# Patient Record
Sex: MALE | Race: WHITE
[De-identification: names, ages, dates, MRNs, and addresses within clinical notes are randomized per-mention and may not be internally consistent; named-entity substitution may affect disease eponyms.]

---

## 2016-09-27 VITALS
DIASTOLIC BLOOD PRESSURE: 86 MMHG | DIASTOLIC BLOOD PRESSURE: 86 MMHG | SYSTOLIC BLOOD PRESSURE: 129 MMHG | SYSTOLIC BLOOD PRESSURE: 129 MMHG

## 2017-02-06 ENCOUNTER — HOSPITAL ENCOUNTER (OUTPATIENT)
Dept: HOSPITAL 44 - OPSURG | Age: 57
End: 2017-02-06
Attending: INTERNAL MEDICINE
Payer: COMMERCIAL

## 2017-02-06 DIAGNOSIS — K29.70: ICD-10-CM

## 2017-02-06 DIAGNOSIS — K20.9: ICD-10-CM

## 2017-02-06 DIAGNOSIS — F17.210: ICD-10-CM

## 2017-02-06 DIAGNOSIS — F10.21: ICD-10-CM

## 2017-02-06 DIAGNOSIS — K22.2: Primary | ICD-10-CM

## 2017-02-06 PROCEDURE — S1016 NON-PVC INTRAVENOUS ADMINIST: HCPCS

## 2017-02-06 PROCEDURE — 43248 EGD GUIDE WIRE INSERTION: CPT

## 2017-02-06 PROCEDURE — 88342 IMHCHEM/IMCYTCHM 1ST ANTB: CPT

## 2017-02-06 PROCEDURE — 88305 TISSUE EXAM BY PATHOLOGIST: CPT

## 2017-02-06 NOTE — GI REPORT
REFERRING PHYSICIAN:

Dr. Niles Clark



GASTROENTEROLOGIST: 

Donald Gerhardt, MD



PROCEDURE MEDICATION:

Propofol as per anesthesia. 



INDICATIONS: 

A 56-year-old man who has has progressive difficulty swallowing over the last 
couple of months.  He states he has never been evaluated before.  Sometimes 
meats, bread, and dry things stop.  He may be able to drink liquids to get them 
down or he may have to regurgitate them.   Patient has a history of alcohol 
abuse in the past but has not drank for a couple of years.  He still smokes a 
pack-and-a-half of cigarettes a day for 30 plus years.  He has had a previous 
cholecystectomy.  He is 5 feet 8 inches and weighs 220 pounds and carries that 
weight centrally.  



PROCEDURE PERFORMED: 

Endoscopy and esophageal dilatation with biopsies. 



PROCEDURE: 

An [employer] video endoscope was passed through the esophagus under direct 
visualization.  Patient has grade 3 esophagitis at the GE junction with 
stricture.   The cardia of the stomach with no obvious mass.  He has severe 
gastritis particularly in the body and antrum with erosions, friability, and 
ulceration.  Duodenal bulb also shows ulceration, superficial.  A guidewire was 
placed in the stomach.  The endoscope was removed and a 51-Samoan, which is 17 
mm, passed without resistance.  The endoscope was re-introduced.  There was 
some bleeding where the stricture was stretched.  We did biopsy the stomach for 
pathology.  We biopsied the GE junction, esophagitis.  I do not see an obvious 
malignancy, though he is a high risk for that.  



FINDINGS: 

1.    Esophageal stricture dilated to a 51-Samoan.

2.    Grade 3 esophagitis. 

3.    Severe gastritis. 



RECOMMENDATIONS: 

1.    Pending the biopsies, he needs to be on a PPI daily like omeprazole 40 mg 
or 20 mg twice a day before breakfast and supper.  

2.    I recommend strongly discontinue tobacco usage.

3.    Sleep with the bed elevated. 

4.    Pending the pathology, if there is Moore's esophagus, or anything else, 
he may need this re-looked at again within 2 years. 





cc:   Dr. Niles MACKENZIE

## 2017-06-09 ENCOUNTER — HOSPITAL ENCOUNTER (OUTPATIENT)
Dept: HOSPITAL 44 - LABRHC | Age: 57
End: 2017-06-09
Attending: FAMILY MEDICINE
Payer: COMMERCIAL

## 2017-06-09 DIAGNOSIS — N39.0: Primary | ICD-10-CM

## 2017-06-09 PROCEDURE — 87186 SC STD MICRODIL/AGAR DIL: CPT

## 2017-06-09 PROCEDURE — 87086 URINE CULTURE/COLONY COUNT: CPT

## 2018-09-03 ENCOUNTER — HOSPITAL ENCOUNTER (EMERGENCY)
Dept: HOSPITAL 44 - ED | Age: 58
Discharge: HOME | End: 2018-09-03
Payer: COMMERCIAL

## 2018-09-03 VITALS
DIASTOLIC BLOOD PRESSURE: 68 MMHG | SYSTOLIC BLOOD PRESSURE: 140 MMHG | SYSTOLIC BLOOD PRESSURE: 140 MMHG | DIASTOLIC BLOOD PRESSURE: 68 MMHG

## 2018-09-03 DIAGNOSIS — N30.01: Primary | ICD-10-CM

## 2018-09-03 PROCEDURE — 87086 URINE CULTURE/COLONY COUNT: CPT

## 2018-09-03 PROCEDURE — 81002 URINALYSIS NONAUTO W/O SCOPE: CPT

## 2018-09-03 PROCEDURE — 99283 EMERGENCY DEPT VISIT LOW MDM: CPT

## 2018-09-03 RX ADMIN — NITROFURANTOIN (MONOHYDRATE/MACROCRYSTALS) ONE MG: 75; 25 CAPSULE ORAL at 21:25

## 2018-09-03 RX ADMIN — DOXYCYCLINE ONE MG: 100 CAPSULE ORAL at 21:25

## 2018-09-03 NOTE — ED PHYSICIAN DOCUMENTATION
General Adult





- HISTORIAN


Historian: patient





- HPI


Stated Complaint: Right Testicular Pain/Difficulty Urinating


Chief Complaint: General Adult


Additional Information: 





Lower abdominal and right scrotal pain began two days ago, with difficulty 

urinating. Took two leftover antibiotic pills earlier today and thought they 

made him better. Had UTI a year ago, his first. No fever. No other modifying 

factors or associated signs. 





- ROS


CONST: no problems





- PAST HX


Past History: hypertension


Allergies/Adverse Reactions: 


                                    Allergies











Allergy/AdvReac Type Severity Reaction Status Date / Time


 


codeine Allergy Intermediate Hives Verified 09/03/18 20:42














Home Medications: 


                                Ambulatory Orders











 Medication  Instructions  Recorded


 


Doxycycline Hyclate [Vibramycin] 100 mg PO Q12H #20 capsule 09/03/18


 


Nitrofurantoin Monohyd/M-Cryst 100 mg PO Q12H #14 capsule 09/03/18





[Macrobid 100 mg Capsule]  


 


amLODIPine BESYLATE [Norvasc] 5 mg PO DAILY 09/03/18














- SOCIAL HX


Smoking History: cigarettes (1- 1 1/2 PPD since 10 y/o)





- FAMILY HX


Family History: No





- VITAL SIGNS


Vital Signs: 





                                   Vital Signs











Temp Pulse Resp BP Pulse Ox


 


 98.1 F   82   18   158/68   98 


 


 09/03/18 20:30  09/03/18 20:30  09/03/18 20:30  09/03/18 20:30  09/03/18 20:30














- REVIEWED ASSESSMENTS


Nursing Assessment  Reviewed: Yes


Vitals Reviewed: Yes





Progress





- Progress


Progress: 





Given macrobid and doxycycline for likelihood of epididymitis in addition to UTI

. 





ED Results Lab/Radiology





- Orders


Orders: 





                                    ED Orders











 Category Date Time Status


 


 Doxycycline Monohydrate [Vibramycin] Med  09/03/18 21:21 Once





 100 mg PO NOW ONE   


 


 Nitrofurantoin Monohyd/M-Cryst [Macrobid] Med  09/03/18 21:21 Once





 100 mg PO NOW ONE   














General Adult Physical Exam





- PHYSICAL EXAM


GENERAL APPEARANCE: mild distress


EENT: eye inspection normal, ENT inspection normal


NECK: normal inspection


RESPIRATORY: no resp distress, breath sounds normal


CVS: reg rate & rhythm, heart sounds normal


ABDOMEN: other (right testicle quite tender to touch. Not tense.)


BACK: no CVA tenderness, other (no vertebral tenderness)


SKIN: warm/dry, normal color


EXTREMITIES: normal range of motion (slow gait), no evidence of injury


NEURO: CN's nml as tested, motor nml, sensation nml, cognition normal





Discharge


Clincal Impression: 


UTI (urinary tract infection)


Qualifiers:


 Urinary tract infection type: acute cystitis Hematuria presence: with hematuria

 Qualified Code(s): N30.01 - Acute cystitis with hematuria





Prescriptions: 


Doxycycline Hyclate [Vibramycin] 100 mg PO Q12H #20 capsule


Nitrofurantoin Monohyd/M-Cryst [Macrobid 100 mg Capsule] 100 mg PO Q12H #14 

capsule


Referrals: 


Niles Clark MD [Primary Care Provider] - 2 Days


Condition: Good


Disposition: 01 HOME, SELF-CARE


Decision to Admit: NO


Decision Time: 21:32

## 2018-09-04 LAB
APPEARANCE UR: (no result)
COLOR,URINE: YELLOW
PH UR STRIP: 6.5 [PH] (ref 5–8)
RBC UR QL: (no result)
UROBILINOGEN URINE: 1 EU (ref 0.2–1)

## 2018-09-05 ENCOUNTER — HOSPITAL ENCOUNTER (OUTPATIENT)
Dept: HOSPITAL 44 - RAD | Age: 58
End: 2018-09-05
Attending: FAMILY MEDICINE
Payer: COMMERCIAL

## 2018-09-05 DIAGNOSIS — M25.562: ICD-10-CM

## 2018-09-05 DIAGNOSIS — M25.512: Primary | ICD-10-CM

## 2018-09-05 PROCEDURE — 73562 X-RAY EXAM OF KNEE 3: CPT

## 2018-09-05 PROCEDURE — 73030 X-RAY EXAM OF SHOULDER: CPT

## 2018-09-05 NOTE — DIAGNOSTIC IMAGING REPORT
ADWOA BETANCUR 

[facility]

[address] [facility] [address] [address]

 

 

 

 

Report Submission Date: Sep 5, 2018 12:52:20 PM CDT

Patient       Study

Name:   KEDAR GILMORE       Date:   Sep 5, 2018 12:17:35 PM CDT

MRN:   [id number]       Modality Type:   DX

Gender:   M       Description:   SHOULDER

:   [birth year]       Institution:   [facility]

Physician:   ADWOA BETANCUR

     Accession:    [id number]

 

 

Examination: Plain film left shoulder 



History: LEFT SHOULDER, PAIN IN LEFT SHOULDER WORSENING FOR ABOUT A MONTH, NO 
KNOWN INJURY (Hx) 



Comparison exams: None provided 



Findings: 3 views of the left shoulder demonstrate normal cortical margins.  No 
evidence for fracture or dislocation. Acromioclavicular joint degenerative 
spurring. No soft tissue abnormality 



Impression: Degenerative changes. No acute osseous process.

 

Electronically signed on Sep 5, 2018 12:52:20 PM CDT by:

Bob MACKENZIE

## 2018-09-05 NOTE — DIAGNOSTIC IMAGING REPORT
ADWOA BETANCUR 

[facility]

[address] [facility] [address] [address]

 

 

 

 

Report Submission Date: Sep 5, 2018 12:53:37 PM CDT

Patient       Study

Name:   KEDAR GILMORE       Date:   Sep 5, 2018 12:23:05 PM CDT

MRN:   [id number]       Modality Type:   DX

Gender:   M       Description:   LOWER EXTREMITY

:   [birth year]       Institution:   [facility]

Physician:   ADWOA BETANCUR

     Accession:    [id number]

 

 

Examination: Plain film left knee 



History: LEFT KNEE, PAIN AND LOCKING IN LEFT KNEE. PT STATES INJURY TO KNEE OVER
20 YEARS AGO WITH PAIN WORSENING SINCE INJURY. NO RECENT INJURY (Hx) 



Findings: 3 views of the left knee demonstrates articular degenerative spurring.
 Medial joint space narrowing. No fracture.  No dislocation. No joint effusion. 
No soft tissue irregularity. 



Impression: Degenerative changes. No acute osseous abnormality

 

Electronically signed on Sep 5, 2018 12:53:37 PM CDT by:

Bob MACKENZIE

## 2019-03-19 ENCOUNTER — HOSPITAL ENCOUNTER (EMERGENCY)
Dept: HOSPITAL 44 - ED | Age: 59
Discharge: HOME | End: 2019-03-19
Payer: COMMERCIAL

## 2019-03-19 VITALS — DIASTOLIC BLOOD PRESSURE: 74 MMHG | SYSTOLIC BLOOD PRESSURE: 142 MMHG

## 2019-03-19 DIAGNOSIS — B34.9: ICD-10-CM

## 2019-03-19 DIAGNOSIS — J11.1: Primary | ICD-10-CM

## 2019-03-19 DIAGNOSIS — Z72.0: ICD-10-CM

## 2019-03-19 LAB
BASOPHILS NFR BLD: 1.8 % (ref 0–1.5)
EGFR (NON-AFRICAN): > 60
EOSINOPHIL NFR BLD: 1.9 % (ref 0–6.8)
MCH RBC QN AUTO: 29.9 PG (ref 28–34)
MCV RBC AUTO: 89 FL (ref 80–100)
MONOCYTES %: 7.3 % (ref 0–11)
NEUTROPHILS #: 6 # K/UL (ref 1.4–7.7)

## 2019-03-19 PROCEDURE — 36415 COLL VENOUS BLD VENIPUNCTURE: CPT

## 2019-03-19 PROCEDURE — 71046 X-RAY EXAM CHEST 2 VIEWS: CPT

## 2019-03-19 PROCEDURE — 99283 EMERGENCY DEPT VISIT LOW MDM: CPT

## 2019-03-19 PROCEDURE — S1016 NON-PVC INTRAVENOUS ADMINIST: HCPCS

## 2019-03-19 PROCEDURE — 85025 COMPLETE CBC W/AUTO DIFF WBC: CPT

## 2019-03-19 PROCEDURE — 80053 COMPREHEN METABOLIC PANEL: CPT

## 2019-03-19 RX ADMIN — BENZONATATE ONE MG: 100 CAPSULE ORAL at 19:46

## 2019-03-19 NOTE — ED PHYSICIAN DOCUMENTATION
Upper Respiratory Symptoms





- HISTORIAN


Historian: patient





- HPI


Stated Complaint: cough/wheezing


Chief Complaint: Cough/ Upper Respiratory


Onset: days ago (11)


Context: denies: recent foreign travel, insect bite(s), tick(s), recent 

chemotherapy, multiple patients, same sx, other


Severity: moderate


Associated Symptoms: fever, chills, runny nose, sore throat, productive cough


Further Comments: yes (58 year old male patient presents with complaints of body

aches, cough, congestion, wheezing, fever and chills for the past 11 days.  

Reports using some old antibiotics that he had at home for a few days. Does not 

know what kind or how many.)





- ROS


CONST/EYES: denies: weakness, eye redness, eye itching, other


CVS/RESP: denies: none, chest pain, shortness of breath, palpitations, other


LYMPH: denies: leg swelling, rash, swollen glands, ankle swelling, other


GI/: none





- PAST HX


Lung Disease: COPD


PE Risk Factors: hypertension


Other History: other (GERD)


Allergies/Adverse Reactions: 


                                    Allergies











Allergy/AdvReac Type Severity Reaction Status Date / Time


 


codeine Allergy Intermediate Hives Verified 03/19/19 18:43














Home Medications: 


                                Ambulatory Orders











 Medication  Instructions  Recorded


 


Benzonatate [Tessalon] 200 mg PO TID PRN #30 capsule 03/19/19














- SOCIAL HX


Smoking History: cigarettes





- FAMILY HX


Family History: denies: none





- VITAL SIGNS


Vital Signs: 


                                   Vital Signs











Temp Pulse Resp BP Pulse Ox


 


 97.6 F   83   24   145/72   94 


 


 03/19/19 18:36  03/19/19 18:36  03/19/19 18:36  03/19/19 18:36  03/19/19 18:36














- REVIEWED ASSESSMENTS


Nursing Assessment  Reviewed: Yes


Vitals Reviewed: Yes





Progress





- Progress


Progress: 





likely false negative influenza or viral syndrome.  Lab WNL





ED Results Lab/Radiology





- Lab Results


Lab Results: 


                                   Lab Results











  03/19/19 03/19/19





  18:45 18:45


 


WBC    10.70 K/ul K/ul





    (4.00-12.00) 


 


RBC    5.20 M/ul M/ul





    (3.90-5.20) 


 


Hgb    15.5 g/dL g/dL





    (12.0-18.0) 


 


Hct    46.5 % %





    (37.0-53.0) 


 


MCV    89.0 fl fl





    (80.0-100.0) 


 


MCH    29.9 pg pg





    (28.0-34.0) 


 


MCHC    33.4 g/dL g/dL





    (30.0-36.0) 


 


RDW    13.9 % %





    (11.3-14.3) 


 


Plt Count    281 K/mm3 K/mm3





    (130-400) 


 


Neut % (Auto)    55.8 % %





    (39.0-79.0) 


 


Lymph % (Auto)    33.2 % %





    (16.0-50.0) 


 


Mono % (Auto)    7.3 % %





    (0.0-11.0) 


 


Eos % (Auto)    1.9 % %





    (0.0-6.8) 


 


Baso % (Auto)    1.8  H 





    (0.0-1.5) 


 


Neut # (Auto)    6.0 # k/uL # k/uL





    (1.4-7.7) 


 


Lymph # (Auto)    3.5 # k/uL # k/uL





    (0.6-4.0) 


 


Mono # (Auto)    0.8 # k/uL # k/uL





    (0.0-0.9) 


 


Eos # (Auto)    0.2 # k/uL # k/uL





    (0.0-0.6) 


 


Baso # (Auto)    0.2 # k/uL # k/uL





    (0.0-0.5) 


 


Sodium  143 mmol/L mmol/L  





   (136-145)  


 


Potassium  3.9 mmol/L mmol/L  





   (3.5-5.1)  


 


Chloride  104 mmol/L mmol/L  





   ()  


 


Carbon Dioxide  27 mmol/L mmol/L  





   (22-30)  


 


BUN  10 mg/dL mg/dL  





   (9-20)  


 


Creatinine  0.69 mg/dL mg/dL  





   (0.66-1.25)  


 


Estimated Creat Clear  371   





   


 


Est GFR ( Amer)  > 60   





  (60 - ) 


 


Est GFR (Non-Af Amer)  > 60   





  (60 - ) 


 


Glucose  99 mg/dL mg/dL  





   ()  


 


Calcium  8.9 mg/dL mg/dL  





   (8.4-10.2)  


 


Total Bilirubin  0.3 mg/dL mg/dL  





   (0.2-1.3)  


 


AST  29 U/L U/L  





   (15-46)  


 


ALT  25 U/L U/L  





   (13-69)  


 


Alkaline Phosphatase  86 U/L U/L  





   ()  


 


Total Protein  8.4 g/dL H g/dL  





   (6.3-8.2)  


 


Albumin  4.0 g/dL g/dL  





   (3.5-5.0)  














- Radiology


Radiology Impressions: 





 


CHEST 2 VIEW 





HISTORY: 


FEVER, COUGH, SMOKER. 





FINDINGS: 


The PA & lateral chest x-ray demonstrate lungs to be clear of focal infiltrates 

and expanded bilaterally. Cardiac silhouette and bony thorax are unremarkable. 





IMPRESSION: 


No active intrathoracic disease seen.


 


Electronically signed on Mar 19, 2019 7:15:27 PM CDT by:


Julio C Martin





- Orders


Orders: 


                                    ED Orders











 Category Date Time Status


 


 Place IV Lock 1T Care  03/19/19 18:37 Active


 


 CHEST 2VIEW [RAD] Stat Exams  03/19/19 18:37 Taken


 


 CBC/PLATELET/DIFF Stat Lab  03/19/19 18:45 Completed


 


 CMP Stat Lab  03/19/19 18:45 Completed


 


 INFLUENZA A&B Stat Lab  03/19/19 18:37 Ordered


 


 Benzonatate [Tessalon] Med  03/19/19 19:31 Once





 200 mg PO NOW ONE   














Upper Respiratory Symptoms





- EXAM


General Appearance: mild distress


EENT: eyes nml inspection, nml ENT inspection, lids & conjunct. nml, PERRL, ear 

nml, nose nml, pharynx nml, airway nml


Respiratory: no resp. distress, breath sounds nml, no pain on inspiration, 

speaks full sentences, no pleuritic chest pain


Abdomen: non-tender, no organomegaly, nml bowel sounds, no distention


CVS: reg rate & rhythm, heart sounds normal, equal pulses, no murmur, no gallop,

 PMI nml, no JVD, no friction rub, 24


Skin: color nml, no rash, warm,dry


Neuro/Psych: oriented x3, neuro intact, depressed mood/affect (flat affect), 

CN's nml as tested





Discharge


Clincal Impression: 


 Influenza, Viral syndrome





Prescriptions: 


Benzonatate [Tessalon] 200 mg PO TID PRN #30 capsule


 PRN Reason: Cough


Referrals: 


Niles Clark MD [Primary Care Provider] - 2 Days


Additional Instructions: 


a virus that cannot be treated with antibiotics. 





 Rest  Have plenty of sleep and rest. Stay away from others while you have a 

cold or flu.


 Take simple painkillers  Such as Tylenol or ibuprofen, to help relieve 

headaches, muscles aches and pains and fever.  


 Keep hydrated (drink plenty of fluids)  This will help keep your throat moist 

and replace fluid lost due to a fever and sweating. Plenty of water is best. 

Avoid caffeine and alcohol as they will make you more dehydrated.


 Eat soft food  If you have a sore throat soft foods are easier to swallow. 

Foods such as chicken soup may help a sore throat and reduce mucous.


    an over the counter decongestant such as pseudoped, dayquil and 

Nyquil at your pharmacy. You may want to try Vicks rub on your chest and/or 

feet.  (Caution: Dayquil and Nyquil contain 325mg of Tylenol/acetaminophen per 

tablespoon)


   


Cough drops as needed for cough and sore throat. 





Increase your fluid intake  juices, hot tea, non-caffeinated beverages





Vitamin C  may be helpful in decreasing the length of your cold.  





Use a humidifier in the room where you sleep.  You can also sit in a steam 

filled bathroom 1-2 times a day.  





Tylenol every 4 hours 650mg -1000mg (do not exceed 4000mg in 24 hours) as needed

for fever, pain and body aches.  





Alternate with Ibuprofen





Ibuprofen 600-800mg every 6 hours as needed for fever, pain and body aches.





See your primary care doctor if your symptoms become worse or do not improve in 

the next 3-4 days.


Condition: Stable


Disposition: 01 HOME, SELF-CARE


Decision to Admit: NO


Decision Time: 19:34

## 2019-03-20 NOTE — DIAGNOSTIC IMAGING REPORT
EWELINA BALLESTEROS (NP) - ER 

[facility]

[address] [address] [address]

 

 

 

 

Report Submission Date: Mar 19, 2019 7:15:27 PM CDT

Patient       Study

Name:   KEDAR GILMORE       Date:   Mar 19, 2019 6:39:53 PM CDT

MRN:   [id number]       Modality Type:   DX

Gender:   M       Description:   CHEST 2VIEW

:   [birth year]       Institution:   [facility]

Physician:   EWELINA BALLESTEROS (NP) - ER

     Accession:    [id number]

 

 

CHEST 2 VIEW 



HISTORY: 

FEVER, COUGH, SMOKER. 



FINDINGS: 

The PA & lateral chest x-ray demonstrate lungs to be clear of focal infiltrates 
and expanded bilaterally. Cardiac silhouette and bony thorax are unremarkable. 



IMPRESSION: 

No active intrathoracic disease seen.

 

Electronically signed on Mar 19, 2019 7:15:27 PM CDT by:

Julio C MACKENZIE

## 2019-08-27 ENCOUNTER — HOSPITAL ENCOUNTER (EMERGENCY)
Dept: HOSPITAL 44 - ED | Age: 59
Discharge: HOME | End: 2019-08-27
Payer: COMMERCIAL

## 2019-08-27 VITALS
DIASTOLIC BLOOD PRESSURE: 71 MMHG | DIASTOLIC BLOOD PRESSURE: 71 MMHG | SYSTOLIC BLOOD PRESSURE: 158 MMHG | SYSTOLIC BLOOD PRESSURE: 158 MMHG

## 2019-08-27 DIAGNOSIS — M54.9: Primary | ICD-10-CM

## 2019-08-27 LAB
BASOPHILS NFR BLD: 0.6 % (ref 0–1.5)
EGFR (NON-AFRICAN): > 60
MCV RBC AUTO: 90 FL (ref 80–100)
NEUTROPHILS #: 5 # K/UL (ref 1.4–7.7)

## 2019-08-27 PROCEDURE — 99284 EMERGENCY DEPT VISIT MOD MDM: CPT

## 2019-08-27 PROCEDURE — 96374 THER/PROPH/DIAG INJ IV PUSH: CPT

## 2019-08-27 PROCEDURE — S1016 NON-PVC INTRAVENOUS ADMINIST: HCPCS

## 2019-08-27 PROCEDURE — 74176 CT ABD & PELVIS W/O CONTRAST: CPT

## 2019-08-27 PROCEDURE — 85025 COMPLETE CBC W/AUTO DIFF WBC: CPT

## 2019-08-27 PROCEDURE — 81002 URINALYSIS NONAUTO W/O SCOPE: CPT

## 2019-08-27 PROCEDURE — 80053 COMPREHEN METABOLIC PANEL: CPT

## 2019-08-27 RX ADMIN — KETOROLAC TROMETHAMINE ONE MG: 30 INJECTION, SOLUTION INTRAMUSCULAR at 16:17

## 2019-08-27 RX ADMIN — CYCLOBENZAPRINE HYDROCHLORIDE ONE MG: 10 TABLET, FILM COATED ORAL at 17:24

## 2019-08-27 NOTE — ED PHYSICIAN DOCUMENTATION
Flank Pain





- HISTORIAN


Historian: patient





- HPI


Stated Complaint: right flank pain


Chief Complaint: Flank Pain


Additional Information: 


Patient is a 59-year-old male who presents to the ER with c/o right sided flank 

pain that started about 2 days ago.  He denies any injuries, denies hx of kidney

stones.  He states that it hurts to walk or sit.  He is not tender upon 

palpation to the spine.


Onset: days ago


Duration: waxing, waning


Timing: still present


Context: denies: out of country travel, bad food, recent trauma


Severity: moderate


Quality: burning, sharp


Associated Symptoms: none


Exacerbated by: upright position, walking


Relieved by: remaining still





- ROS


CONST: no problems


GI/: none


CVS/RESP: none


EYES/ENT: none


MS/SKIN/LYMPH: none


NEURO/PSYCH: none





- SOCIAL HX


Smoking History: greater than 1 pack/day


Alcohol Use: none


Drug Use: none





- FAMILY HX


Family History: none





- PAST HX


Past History: GERD


Other History: hypertension


Surgeries/Procedures: cholecystectomy


Immunizations: UTD


Medications: see nurse note


Allergies: NKDA





- VITAL SIGNS


Vital Signs: 





                                   Vital Signs











Temp Pulse Resp BP Pulse Ox


 


 97.6 F   73   18   146/76   96 


 


 08/27/19 15:32  08/27/19 15:32  08/27/19 15:32  08/27/19 15:32  08/27/19 15:32














- REVIEWED ASSESSMENTS


Nursing Assessment  Reviewed: Yes


Vitals Reviewed: Yes





ED Results Lab/Radiology





- Lab Results


Lab Results: 





                                   Lab Results











  08/27/19





  16:23


 


WBC  9.60 K/ul K/ul





   (4.00-12.00) 


 


RBC  4.85 M/ul M/ul





   (3.90-5.20) 


 


Hgb  14.9 g/dL g/dL





   (12.0-18.0) 


 


Hct  43.4 % %





   (37.0-53.0) 


 


MCV  90.0 fl fl





   (80.0-100.0) 


 


MCH  30.7 pg pg





   (28.0-34.0) 


 


MCHC  34.3 g/dL g/dL





   (30.0-36.0) 


 


RDW  12.7 % %





   (11.3-14.3) 


 


Plt Count  261 K/mm3 K/mm3





   (130-400) 


 


Neut % (Auto)  52.2 % %





   (39.0-79.0) 


 


Lymph % (Auto)  36.3 % %





   (16.0-50.0) 


 


Mono % (Auto)  8.3 % %





   (0.0-11.0) 


 


Eos % (Auto)  2.6 % %





   (0.0-6.8) 


 


Baso % (Auto)  0.6 % %





   (0.0-1.5) 


 


Neut # (Auto)  5.0 # k/uL # k/uL





   (1.4-7.7) 


 


Lymph # (Auto)  3.5 # k/uL # k/uL





   (0.6-4.0) 


 


Mono # (Auto)  0.8 # k/uL # k/uL





   (0.0-0.9) 


 


Eos # (Auto)  0.3 # k/uL # k/uL





   (0.0-0.6) 


 


Baso # (Auto)  0.1 # k/uL # k/uL





   (0.0-0.5) 














- Radiology


Radiology Impressions: 


Exam:  CT abdomen and pelvis without contrast. 


History:  Right flank pain. 


Axial images through the abdomen and pelvis without oral or IV contrast is 

submitted along with sagittal and coronal reformatted images. 


The visualized lower lung fields are clear.  No free intraperitoneal air is 

noted.  The gallbladder is not visualized.  The liver, spleen and pancreas are 

normal attenuation.  The adrenal glands are normal configuration.  The abdominal

aorta is of normal caliber.  No periaortic lymphadenopathy is identified. 


Both kidneys are normal attenuation without hydronephrosis or hydroureter.  The 

urinary bladder is distended without trabeculation. 


The visualized appendix is of normal configuration.  The small bowel is of 

normal caliber.  Air and stool seen throughout the large intestine.  No 

inflammatory changes in the mesentery or ascites is identified. 


No bony abnormalities are identified. 





Impression: 


no hydronephrosis or hydroureter.   


Nonspecific bowel gas pattern.   


No inflammatory changes in the mesentery or ascites is identified.








- Orders


Orders: 





                                    ED Orders











 Category Date Time Status


 


 Place IV Lock 1T Care  08/27/19 16:09 Active


 


 KIDNEY STONE PROTOCOL [CT ABD & PELVIS W/O CON] Stat Exams  08/27/19 Taken


 


 CBC/PLATELET/DIFF Routine Lab  08/27/19 16:23 Completed


 


 CMP Routine Lab  08/27/19 16:23 Received


 


 URINALYSIS Routine Lab  08/27/19 Ordered


 


 Ketorolac Tromethamine [Toradol] Med  08/27/19 16:09 Discontinued





 30 mg IV NOW ONE   














Abdominal Pain Physical Exam





- Physical Exam


General Appearance: alert, mild distress


EENT: eye inspection normal, ENT inspection normal, pharynx normal, no signs of 

dehydration, LIAM


NECK: normal inspection, supple


RESPIRATORY: chest non-tender, breath sounds normal


CVS: heart sounds normal, equal pulses


ABDOMEN: decreased BS


BACK: CVA tenderness (R)


SKIN: warm/dry


EXTREMITIES: non-tender, normal range of motion


NEURO: oriented X3, CN's nml as tested, motor nml, sensation nml, mood/affect 

nml, cognition normal


Vital Signs: 





                                   Vital Signs











Temp Pulse Resp BP Pulse Ox


 


 97.6 F   73   18   146/76   96 


 


 08/27/19 15:32  08/27/19 15:32  08/27/19 15:32  08/27/19 15:32  08/27/19 15:32














Discharge


Clincal Impression: 


 Back pain





Referrals: 


Niles Clark MD [Primary Care Provider] - 2 Days


Additional Instructions: 


Take Cyclobenzaprine 10mg by mouth every 8 hours as needed for muscle spasm (no 

driving)


Alternate Tylenol and Ibuprofen as needed for pain


Take Tramadol 50mg by mouth every 6 hours as needed for breakthrough pain


Keep appointment with PCP tomorrow


Condition: Good


Disposition: 01 HOME, SELF-CARE


Decision to Admit: NO


Decision Time: 17:00

## 2019-08-28 LAB
APPEARANCE UR: CLEAR
COLOR,URINE: YELLOW
PH UR STRIP: 6 [PH] (ref 5–8)
RBC UR QL: (no result)
UROBILINOGEN URINE: 0.2 EU (ref 0.2–1)

## 2019-08-28 NOTE — DIAGNOSTIC IMAGING REPORT
[facility] 

[facility]

[address] [facility] [address]

[place], [place]. [address]

 

 

 

 

Report Submission Date: Aug 27, 2019 4:49:14 PM CDT

Patient       Study

Name:   KEDAR GILMORE       Date:   Aug 27, 2019 4:20:50 PM CDT

MRN:   [id number]       Modality Type:   CT\SR

Gender:   M       Description:   CT ABD PELVIS W/O CO

:   [birth year]       Institution:   [facility]

Physician:   [facility]

     Accession:    [id number]

 

 

Exam:  CT abdomen and pelvis without contrast. 



History:  Right flank pain. 



Axial images through the abdomen and pelvis without oral or IV contrast is 
submitted along with sagittal and coronal reformatted images. 



The visualized lower lung fields are clear.  No free intraperitoneal air is 
noted.  The gallbladder is not visualized.  The liver, spleen and pancreas are 
normal attenuation.  The adrenal glands are normal configuration.  The abdominal
aorta is of normal caliber.  No periaortic lymphadenopathy is identified. 



Both kidneys are normal attenuation without hydronephrosis or hydroureter.  The 
urinary bladder is distended without trabeculation. 



The visualized appendix is of normal configuration.  The small bowel is of 
normal caliber.  Air and stool seen throughout the large intestine.  No 
inflammatory changes in the mesentery or ascites is identified. 



No bony abnormalities are identified. 



Impression: 

no hydronephrosis or hydroureter.   

Nonspecific bowel gas pattern.   

No inflammatory changes in the mesentery or ascites is identified.

 

Electronically signed on Aug 27, 2019 4:49:14 PM CDT by:

Chadwick MACKENZIE

## 2020-01-06 ENCOUNTER — HOSPITAL ENCOUNTER (OUTPATIENT)
Dept: HOSPITAL 44 - OPSURG | Age: 60
Discharge: HOME | End: 2020-01-06
Attending: INTERNAL MEDICINE | Admitting: FAMILY MEDICINE
Payer: COMMERCIAL

## 2020-01-06 DIAGNOSIS — K22.2: ICD-10-CM

## 2020-01-06 DIAGNOSIS — R13.10: Primary | ICD-10-CM

## 2020-01-06 DIAGNOSIS — K22.8: ICD-10-CM

## 2020-01-06 PROCEDURE — 88305 TISSUE EXAM BY PATHOLOGIST: CPT

## 2020-01-06 PROCEDURE — 43239 EGD BIOPSY SINGLE/MULTIPLE: CPT

## 2020-01-06 PROCEDURE — 93005 ELECTROCARDIOGRAM TRACING: CPT

## 2020-01-06 PROCEDURE — 43248 EGD GUIDE WIRE INSERTION: CPT

## 2020-01-08 NOTE — GI REPORT
DATE OF PROCEDURE:   01/06/2020   

 

REFERRING PHYSICIAN:  Dr. Clark.



PROCEDURE PERFORMED:  Endoscopy, dilatation, biopsy.



SURGEON:  Donald Gerhardt, M.D., CAROCJESSICA.



INDICATION FOR PROCEDURE:  This 59-year-old man has had difficulty swallowing. 
He did have a stricture and esophagitis we dilated several years ago. He 
apparently was taking Zantac and an H2 blocker and stopped it when there was the
recall. He started having difficulty swallowing again. Unfortunately, he 
continues to use tobacco. He was positive for H. pylori and was treated with a 
triple regimen last time in 2017. 



PROCEDURE MEDICATION:  Propofol, as per Anesthesia. 



DESCRIPTION OF PROCEDURE:  The Olympus video endoscope was passed through the 
esophagus under direct visualization. He does have a benign-appearing stricture 
of the GE junction, and at least grade 2 esophagitis. Fundus, body and antrum of
the stomach: There still was some gastritis. We did re-biopsy to see whether H. 
pylori persists or not. Duodenal bulb, first and second part of the duodenum: 
Examined, and was normal. A guidewire was placed in the stomach. The endoscope 
was removed and a #51, 17 mm Savary-Jodee was passed without resistance. The 
endoscope was reintroduced. There was some bleeding where the stricture was 
stretched. We also then took biopsies at the GE junction for pathology. The 
patient tolerated the procedure well, though on his EKG before the procedure he 
may have a partial bundle branch block. 



FINDINGS:   

1. Esophageal stricture dilated to #51F. 

2. Esophagitis.

3. Gastritis.



RECOMMENDATIONS:  I would put him back on a PPI like omeprazole at least 20 mg 
before his first meal daily, elevate the bed and discontinue tobacco use would 
be beneficial. In addition, I think he probably needs a cardiac evaluation. I 
suspect he has underlying risk for heart disease. He is to follow up with Dr. Clark. 



 





DONALD GERHARDT, M.D., F.A.C.P.   

IRAJ/renée

D:  01/06/2020

T:  01/08/2020

Job#: [id number]



Cc:   Dr. Eduardo MACKENZIE